# Patient Record
Sex: FEMALE | Race: BLACK OR AFRICAN AMERICAN | NOT HISPANIC OR LATINO | Employment: STUDENT | URBAN - METROPOLITAN AREA
[De-identification: names, ages, dates, MRNs, and addresses within clinical notes are randomized per-mention and may not be internally consistent; named-entity substitution may affect disease eponyms.]

---
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
## 2025-02-04 ENCOUNTER — OFFICE VISIT (OUTPATIENT)
Dept: OBGYN CLINIC | Facility: CLINIC | Age: 20
End: 2025-02-04
Payer: COMMERCIAL

## 2025-02-04 ENCOUNTER — PREP FOR PROCEDURE (OUTPATIENT)
Dept: INTERVENTIONAL RADIOLOGY/VASCULAR | Facility: CLINIC | Age: 20
End: 2025-02-04

## 2025-02-04 VITALS
TEMPERATURE: 97.8 F | OXYGEN SATURATION: 97 % | HEIGHT: 68 IN | HEART RATE: 79 BPM | WEIGHT: 149.2 LBS | BODY MASS INDEX: 22.61 KG/M2 | RESPIRATION RATE: 16 BRPM

## 2025-02-04 DIAGNOSIS — R22.32 MASS OF LEFT ELBOW: Primary | ICD-10-CM

## 2025-02-04 DIAGNOSIS — M79.89 SOFT TISSUE MASS: Primary | ICD-10-CM

## 2025-02-04 PROCEDURE — 99204 OFFICE O/P NEW MOD 45 MIN: CPT | Performed by: STUDENT IN AN ORGANIZED HEALTH CARE EDUCATION/TRAINING PROGRAM

## 2025-02-04 RX ORDER — SODIUM CHLORIDE 9 MG/ML
30 INJECTION, SOLUTION INTRAVENOUS CONTINUOUS
OUTPATIENT
Start: 2025-02-04

## 2025-02-04 NOTE — PATIENT INSTRUCTIONS
You have been sent for a biopsy with interventional radiology. Biopsy results can take several weeks to be reviewed by both a pathologist and Dr. Hernandez personally, additionally any other specialist required. At times, biopsies are sent to specialty hospitals for additional testing which extends the turn around time for results. Please call the office at [phone] to ensure your follow-up appointment has been scheduled.

## 2025-02-04 NOTE — PROGRESS NOTES
Orthopedic Oncology Surgery Office Note  Asiya Rouse (19 y.o. female)  : 2005 Encounter Date: 2025  Dr. Cristóbal Hernandez, , Orthopedic Surgeon  Orthopedic Oncology & Sarcoma Surgery   Phone:[phone] Fax:[phone]    Assessment, Plan, & Discussion:   Asiya Rouse is a 19 y.o. female with:    Soft tissue mass of left elbow  Discussed with the patient that:  - Reviewed physical exam and imaging with patient at time of visit. Radiographic findings demonstrate soft tissue mass of left lateral elbow.  - explained that based on imaging, the mass appears to be benign however there is not way to be sure without having a biopsy and a pathology  -Discussed that based on the imaging I feel this is possibly an arteriovenous malformation although could be a malignancy.  - referral to IR for biopsy was placed at time of visit  - patient will follow-up in the office approximately 2 weeks following biopsy to review results and develop a treatment plan  - The patient expresses understanding and is in agreement with today's treatment plan.       Comorbidity, including: none  - continue current management     Surgical Planning:   Future surgical planning based on imaging results    Follow Up & Tasks:     Return for After IR biopsy.     Tasks:  Review pathology results  ___________________________________________________________________________    History of Present Illness:     Asiya Rouse is a 19 y.o. female with history of left elbow mass who presents for consultation at the request of [employer] regarding left elbow mass.  On presentation today patient presents with her mother at time of visit.  Patient states that the mass has been present for approximately 9 years, and that she does not member any mechanism of injury or accident or trauma that caused the mass to begin.  She states that one day she looked down on her arm and noticed there was a mass there but it has not gone away since.  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 She states that she does not believe that it has changed in size over the past 9 years.  She states that she only has pain when she has direct contact with the mass or when trying to extend her elbow fully.  She states this has not limited her ability to complete normal daily activities.      At baseline patient gaits without assistance.  Denies constitutional symptoms such as fever, chills, night sweats, fatigue, weight gains/losses. Denies  chest pain/shortness of breath.  Patient Denies  personal history of cancer.    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  Complete 12 system review performed and found to be negative except: except as per mentioned in HPI.    Oncology and Treatment History:      Review of referring provider's records:  Referring provider: [employer]  Date: Tiffanie [facility] PA-C  Impression: Patient referred to Orthopedic Oncology at [facility]    Patient Care team:   Patient Care Team:  [employer] as PCP - General     Oncology History    No history exists.       Physical Examination:     Height: 5' 8\" (172.7 cm)  Weight - Scale: 67.7 kg (149 lb 3.2 oz)  BMI (Calculated): 22.7  BSA (Calculated - m2): 1.8 sq meters  Pulse Oximetry  SpO2: 97 %     Vitals:    02/04/25 0942   Pulse: 79   Resp: 16   Temp: 97.8 °F (36.6 °C)   SpO2: 97%     Body mass index is 22.69 kg/m².    General: alert and oriented x 3; well nourished/well developed; no apparent distress.   Present with mother  Psychiatric: normal mood and affect  HEENT: NCAT. Head/neck - full range of motion.   Lungs: breathing comfortably; equal symmetric chest expansion.   Abdomen: soft, non-tender, non-distended.   Skin: warm; dry; no lesions, rashes, petechiae or purpura; " no clubbing, no cyanosis, no edema, soft tissue mass of left lateral elbow, semi-hard palpable    Extremity: Left elbow   Inspection: no edema, skin abnormalities throughout   Palpation:  soft tissue mass of left lateral elbow, semi-hard palpable   Range of motion of joints: WNL range of motion of right upper extremity, decreased elbow extension of left elbow approximately 10 degrees   Motor strength: WNL all extremities.  intact. Dorsal/Plantar flexion: intact.   Sensation: grossly intact to all extremities.    Pulses: intact   Lymphatics: (no obvious) lymphadenopathy  Gait: normal gait.      Imaging Results:   All images personally review today by Dr. Hernandez    Study: MRI left elbow w [facility]  Date: 1/29/25  Report: I have read and agree with the radiologist report. and I have personally reviewed the imaging in PACS and my impression is as follows:  Impression:     1. Septated soft tissue mass, along the lateral cortex of the distal humeral   metadiaphysis/capitellum with mild adjacent bone marrow edema and osseous   remodeling; nonspecific; differential considerations include giant cell tumor of   tendon sheath, myxoma, less likely a vascular lesion; malignant etiologies   remain a consideration, however, felt to be less likely; orthopedic surgical   oncology consultation recommended     2.  Mild intramuscular edema and atrophy of the extensor carpi radialis brevis   muscle, likely secondary to the adjacent soft tissue mass     Study: MRI left elbow [facility]  Date: 1/14/25  Report: I have read and agree with the radiologist report. and I have personally reviewed the imaging in PACS and my impression is as follows:  IMPRESSION:     Soft tissue mass measuring up to 3.5 cm, centered just above the lateral   epicondyle and extensor carpi radialis longus musculature, as discussed.   Orthopedic oncology consultation is recommended.     Study: XR left elbow  Date: 12/03/24  Report: I have read and agree with the radiologist  report. and I have personally reviewed the imaging in PACS and my impression is as follows:  Right elbow x-rays 3 views done in clinic today to evaluate right elbow   pain: There are no acute fractures or dislocations.  There are no bone   lesions or masses.  There are no significant degenerative changes.       Study: US upper extremity  Date: 12/02/24  Report: I have read and agree with the radiologist report. 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 Further evaluation with MRI left elbow with and without intravenous contrast is recommended. MRI appears to be ordered in the PACS system and to be done on 12/23/2024.     No discrete mass or fluid collection involving the right elbow in the visualized   portions in the area of patient's palpable abnormality pointed out by the   patient. Continued clinical follow-up is recommended and if symptoms persist   further evaluation with MRI recommended.       Pathology & Pertinent Laboratory Findings:      Pertinent laboratory findings:  N/a    Pathology: Pending  Patient scheduled for IR biopsy    Microbiology:  Cultures: N/a    Medical, Surgical, Family, and Social History      History reviewed. No pertinent past medical history.  Past Surgical History:   Procedure Laterality Date    ARTHROSCOPY KNEE Left 5/7/2021    Procedure: ARTHROSCOPY KNEE;  Surgeon: Kevin Arriola DO;  Location: [facility];  Service: Orthopedics    KNEE ARTHROSCOPY Left     KNEE ARTHROSCOPY W/ MENISCAL REPAIR Left 5/7/2021    Procedure: REPAIR MENISCUS;  Surgeon: Kevin Arriola DO;  Location: [facility];  Service: Orthopedics    MS ARTHROSCOPY KNEE W/LYSIS ADHESIONS W/WO MANJ SPX Left 8/23/2021    Procedure: MS KNEE SCOPE,LYSIS  OF ADHESNS synovectomy manipulation of left knee under anesthesia;  Surgeon: Wolfgang Metzger MD;  Location: [facility];  Service: Orthopedics    DE MANIPULATION KNEE JOINT UNDER GENERAL ANESTHESIA Left 9/9/2021    Procedure: MANIPULATION JOINT KNEE;  Surgeon: Wolfgang Metzger MD;  Location: [facility];  Service: Orthopedics     No current outpatient medications on file.  No Known Allergies  Family History   Problem Relation Age of Onset    No Known Problems Mother     Diabetes Father      Social History     Socioeconomic History    Marital status: Single     Spouse name: Not on file    Number of children: Not on file    Years of education: Not on file    Highest education level: Not on file   Occupational History    Not on file   Tobacco Use    Smoking status: Never    Smokeless tobacco: Never   Vaping Use    Vaping status: Never Used   Substance and Sexual Activity    Alcohol use: Never    Drug use: Never    Sexual activity: Never   Other Topics Concern    Not on file   Social History Narrative    Not on file     Social Drivers of Health     Financial Resource Strain: Low Risk  (11/22/2024)    Received from [facility]    Overall Financial Resource Strain (CARDIA)     Difficulty of Paying Living Expenses: Not hard at all   Food Insecurity: No Food Insecurity (11/22/2024)    Received from [facility]    Hunger Vital Sign     Worried About Running Out of Food in the Last Year: Never true     Ran Out of Food in the Last Year: Never true   Transportation Needs: No Transportation Needs (11/22/2024)    Received from [facility]    PRAPARE - Transportation     Lack of Transportation (Medical): No     Lack of Transportation (Non-Medical): No   Physical Activity: Not on file   Stress: Not on file   Social Connections: Feeling Socially Isolated (11/22/2024)    Received from [facility]    OASIS : Social Isolation     How often do you feel lonely or  isolated from those around you?: Often   Intimate Partner Violence: Not At Risk (11/22/2024)    Received from [facility]    Humiliation, Afraid, Rape, and Kick questionnaire     Fear of Current or Ex-Partner: No     Emotionally Abused: No     Physically Abused: No     Sexually Abused: No   Housing Stability: Unknown (11/22/2024)    Received from [facility]    Housing Stability Vital Sign     Unable to Pay for Housing in the Last Year: No     Number of Times Moved in the Last Year: Not on file     Homeless in the Last Year: No       This Visit:       Scribe Attestation      I,:  Racheal Mcguire am acting as a scribe while in the presence of the attending physician.:       I,:  Cristóbal Hernandez, DO personally performed the services described in this documentation    as scribed in my presence.:                Associated Orders:     Problem List Items Addressed This Visit    None  Visit Diagnoses         Soft tissue mass    -  Primary    Relevant Orders    Ambulatory Referral to Interventional Radiology

## 2025-02-19 NOTE — PRE-PROCEDURE INSTRUCTIONS
Voicemail left with an arrival time of 9am on 2/26 at [facility] with IR for her biopsy. Pt was instructed to have nothing to eat or drink after midnight and to have a ride home as she is booked with sedation and can not drive herself home. Ivan back number provided for any questions.

## 2025-02-26 ENCOUNTER — HOSPITAL ENCOUNTER (OUTPATIENT)
Dept: CT IMAGING | Facility: HOSPITAL | Age: 20
Discharge: HOME | End: 2025-02-26
Attending: RADIOLOGY
Payer: COMMERCIAL

## 2025-02-26 VITALS
RESPIRATION RATE: 40 BRPM | HEART RATE: 67 BPM | SYSTOLIC BLOOD PRESSURE: 118 MMHG | OXYGEN SATURATION: 100 % | TEMPERATURE: 97.4 F | DIASTOLIC BLOOD PRESSURE: 66 MMHG

## 2025-02-26 DIAGNOSIS — R22.32 MASS OF LEFT ELBOW: ICD-10-CM

## 2025-02-26 LAB
BASOPHILS # BLD AUTO: 0.02 THOUSANDS/ÂΜL (ref 0–0.1)
BASOPHILS NFR BLD AUTO: 1 % (ref 0–1)
EOSINOPHIL # BLD AUTO: 0.03 THOUSAND/ÂΜL (ref 0–0.61)
EOSINOPHIL NFR BLD AUTO: 1 % (ref 0–6)
ERYTHROCYTE [DISTWIDTH] IN BLOOD BY AUTOMATED COUNT: 17 % (ref 11.6–15.1)
EXT PREGNANCY TEST URINE: NEGATIVE
EXT. CONTROL: NORMAL
HCT VFR BLD AUTO: 31.8 % (ref 34.8–46.1)
HGB BLD-MCNC: 8.5 G/DL (ref 11.5–15.4)
IMM GRANULOCYTES # BLD AUTO: 0.01 THOUSAND/UL (ref 0–0.2)
IMM GRANULOCYTES NFR BLD AUTO: 0 % (ref 0–2)
INR PPP: 0.93 (ref 0.85–1.19)
LYMPHOCYTES # BLD AUTO: 1.03 THOUSANDS/ÂΜL (ref 0.6–4.47)
LYMPHOCYTES NFR BLD AUTO: 32 % (ref 14–44)
MCH RBC QN AUTO: 19.3 PG (ref 26.8–34.3)
MCHC RBC AUTO-ENTMCNC: 26.7 G/DL (ref 31.4–37.4)
MCV RBC AUTO: 72 FL (ref 82–98)
MONOCYTES # BLD AUTO: 0.29 THOUSAND/ÂΜL (ref 0.17–1.22)
MONOCYTES NFR BLD AUTO: 9 % (ref 4–12)
NEUTROPHILS # BLD AUTO: 1.86 THOUSANDS/ÂΜL (ref 1.85–7.62)
NEUTS SEG NFR BLD AUTO: 57 % (ref 43–75)
NRBC BLD AUTO-RTO: 0 /100 WBCS
PLATELET # BLD AUTO: 259 THOUSANDS/UL (ref 149–390)
PMV BLD AUTO: 9.7 FL (ref 8.9–12.7)
PROTHROMBIN TIME: 13.2 SECONDS (ref 12.3–15)
RBC # BLD AUTO: 4.41 MILLION/UL (ref 3.81–5.12)
WBC # BLD AUTO: 3.24 THOUSAND/UL (ref 4.31–10.16)

## 2025-02-26 PROCEDURE — 76942 ECHO GUIDE FOR BIOPSY: CPT | Performed by: STUDENT IN AN ORGANIZED HEALTH CARE EDUCATION/TRAINING PROGRAM

## 2025-02-26 PROCEDURE — 85025 COMPLETE CBC W/AUTO DIFF WBC: CPT | Performed by: RADIOLOGY

## 2025-02-26 PROCEDURE — 99152 MOD SED SAME PHYS/QHP 5/>YRS: CPT | Performed by: STUDENT IN AN ORGANIZED HEALTH CARE EDUCATION/TRAINING PROGRAM

## 2025-02-26 PROCEDURE — 88305 TISSUE EXAM BY PATHOLOGIST: CPT | Performed by: PATHOLOGY

## 2025-02-26 PROCEDURE — 85610 PROTHROMBIN TIME: CPT | Performed by: RADIOLOGY

## 2025-02-26 PROCEDURE — 88184 FLOWCYTOMETRY/ TC 1 MARKER: CPT | Performed by: STUDENT IN AN ORGANIZED HEALTH CARE EDUCATION/TRAINING PROGRAM

## 2025-02-26 PROCEDURE — 88333 PATH CONSLTJ SURG CYTO XM 1: CPT | Performed by: PATHOLOGY

## 2025-02-26 PROCEDURE — 20206 BIOPSY MUSCLE PERQ NEEDLE: CPT | Performed by: STUDENT IN AN ORGANIZED HEALTH CARE EDUCATION/TRAINING PROGRAM

## 2025-02-26 PROCEDURE — 88185 FLOWCYTOMETRY/TC ADD-ON: CPT | Performed by: STUDENT IN AN ORGANIZED HEALTH CARE EDUCATION/TRAINING PROGRAM

## 2025-02-26 RX ORDER — MIDAZOLAM HYDROCHLORIDE 2 MG/2ML
INJECTION, SOLUTION INTRAMUSCULAR; INTRAVENOUS AS NEEDED
Status: COMPLETED | OUTPATIENT
Start: 2025-02-26 | End: 2025-02-26

## 2025-02-26 RX ORDER — SODIUM CHLORIDE 9 MG/ML
30 INJECTION, SOLUTION INTRAVENOUS CONTINUOUS
Status: DISCONTINUED | OUTPATIENT
Start: 2025-02-26 | End: 2025-03-02 | Stop reason: HOSPADM

## 2025-02-26 RX ORDER — FENTANYL CITRATE 50 UG/ML
INJECTION, SOLUTION INTRAMUSCULAR; INTRAVENOUS AS NEEDED
Status: COMPLETED | OUTPATIENT
Start: 2025-02-26 | End: 2025-02-26

## 2025-02-26 RX ORDER — LIDOCAINE WITH 8.4% SOD BICARB 0.9%(10ML)
SYRINGE (ML) INJECTION AS NEEDED
Status: COMPLETED | OUTPATIENT
Start: 2025-02-26 | End: 2025-02-26

## 2025-02-26 RX ADMIN — MIDAZOLAM HYDROCHLORIDE 1 MG: 1 INJECTION, SOLUTION INTRAMUSCULAR; INTRAVENOUS at 12:01

## 2025-02-26 RX ADMIN — FENTANYL CITRATE 25 MCG: 50 INJECTION, SOLUTION INTRAMUSCULAR; INTRAVENOUS at 12:22

## 2025-02-26 RX ADMIN — Medication 5 ML: at 12:13

## 2025-02-26 RX ADMIN — FENTANYL CITRATE 25 MCG: 50 INJECTION, SOLUTION INTRAMUSCULAR; INTRAVENOUS at 12:01

## 2025-02-26 RX ADMIN — MIDAZOLAM HYDROCHLORIDE 0.5 MG: 1 INJECTION, SOLUTION INTRAMUSCULAR; INTRAVENOUS at 12:22

## 2025-02-26 NOTE — H&P
Interventional Radiology  History and Physical 2/26/2025     Asiya Rouse   2005   [id number]    Assessment/Plan:  Very pleasant 19-year-old woman with a left elbow soft tissue mass, present for many years, of uncertain etiology.  She has been referred for percutaneous biopsy to establish a diagnosis and guide management.    Problem List Items Addressed This Visit    None  Visit Diagnoses         Mass of left elbow        Relevant Orders    IR biopsy upper limb               Subjective:     Patient ID: Asiya Rouse is a 19 y.o. female.    History of Present Illness  Very pleasant 19-year-old woman with a left elbow soft tissue mass, present for many years, of uncertain etiology.  She has been referred for percutaneous biopsy to establish a diagnosis and guide management.          No past medical history on file.     Past Surgical History:   Procedure Laterality Date    ARTHROSCOPY KNEE Left 5/7/2021    Procedure: ARTHROSCOPY KNEE;  Surgeon: Kevin Arriola DO;  [facility];  Service: Orthopedics    KNEE ARTHROSCOPY Left     KNEE ARTHROSCOPY W/ MENISCAL REPAIR Left 5/7/2021    Procedure: REPAIR MENISCUS;  Surgeon: Kevin Arriola DO;  [facility];  Service: Orthopedics    AL ARTHROSCOPY KNEE W/LYSIS ADHESIONS W/WO MANJ SPX Left 8/23/2021    Procedure: AL KNEE SCOPE,LYSIS OF ADHESNS synovectomy manipulation of left knee under anesthesia;  Surgeon: Wolfgang Metzger MD;  [facility];  Service: Orthopedics    AL MANIPULATION KNEE JOINT UNDER GENERAL ANESTHESIA Left 9/9/2021    Procedure: MANIPULATION JOINT KNEE;  Surgeon: Wolfgang Metzger MD;  [facility]: [facility];  Service: Orthopedics        Social History     Tobacco Use   Smoking Status Never   Smokeless Tobacco Never        Social History     Substance and Sexual Activity   Alcohol Use Never        Social History     Substance and Sexual Activity   Drug Use Never        No Known Allergies    No current outpatient medications on  "file.     Current Facility-Administered Medications   Medication Dose Route Frequency Provider Last Rate Last Admin    sodium chloride 0.9 % infusion  30 mL/hr Intravenous Continuous Marilyn Enriquez MD              Objective:    Vitals:    02/26/25 0913   BP: 130/74   Pulse: 72   Resp: 14   Temp: (!) 97.4 °F (36.3 °C)   TempSrc: Oral   SpO2: 100%     Physical Exam:  General: Well appearing, no acute distress.  HEENT: NC/AT.  EOMI.  CV: RRR  Chest: Normal respiratory effort.  Speaking in full sentences.  Abdomen: Soft, ND/NT.  Skin: Warm and dry  Neuro: Alert and oriented x 3.  No focal deficits.    Left elbow - palpable somewhat firm mass, mildly tender to firm palpation.         No results found for: \"BNP\"   Lab Results   Component Value Date    WBC 3.24 (L) 02/26/2025    HGB 8.5 (L) 02/26/2025    HCT 31.8 (L) 02/26/2025    MCV 72 (L) 02/26/2025     02/26/2025     Lab Results   Component Value Date    INR 0.93 02/26/2025    INR 0.97 08/13/2021    PROTIME 13.2 02/26/2025    PROTIME 12.9 08/13/2021     Lab Results   Component Value Date    PTT 34 08/13/2021         I have personally reviewed pertinent imaging and laboratory results.     Code Status: No Order  Advance Directive and Living Will:      Power of :    POLST:      This text is generated with voice recognition software. There may be translation, syntax,  or grammatical errors. If you have any questions, please contact the dictating provider.   "

## 2025-02-26 NOTE — BRIEF OP NOTE (RAD/CATH)
INTERVENTIONAL RADIOLOGY PROCEDURE NOTE    Date: 2/26/2025    Procedure:   Procedure Summary       Date: 02/26/25 Room / Location: [facility] CT Scan    Anesthesia Start:  Anesthesia Stop:     Procedure: IR BIOPSY UPPER LIMB Diagnosis:       Mass of left elbow      (soft tissue mass of left elbow, rule out malignancy, non diagnostic XR and MRI)    Scheduled Providers:  Responsible Provider:     Anesthesia Type: Not recorded ASA Status: Not recorded            Preoperative diagnosis:   1. Mass of left elbow         Postoperative diagnosis: Same.    Surgeon: Brian Gonzalez MD     Assistant: None. No qualified resident was available.    Blood loss: Minimal    Specimens: Four 18-gauge x 1.3 cm cores     Findings:     Heterogeneous tissue mass in the left elbow, mildly vascular.    Uncomplicated US- and CT-guided biopsy of the mass.  Multiplanar reformats of the CT images were sent to document needle trajectory.    Hemostasis was obtained with manual pressure.    Complications: None immediate.    Anesthesia: conscious sedation

## 2025-03-04 LAB — SCAN RESULT: NORMAL

## 2025-04-01 ENCOUNTER — OFFICE VISIT (OUTPATIENT)
Dept: OBGYN CLINIC | Facility: CLINIC | Age: 20
End: 2025-04-01
Payer: COMMERCIAL

## 2025-04-01 VITALS
HEART RATE: 73 BPM | HEIGHT: 68 IN | TEMPERATURE: 98.1 F | OXYGEN SATURATION: 99 % | WEIGHT: 148.2 LBS | BODY MASS INDEX: 22.46 KG/M2

## 2025-04-01 DIAGNOSIS — Q27.30 ARTERIOVENOUS MALFORMATION (AVM): Primary | ICD-10-CM

## 2025-04-01 PROBLEM — M25.422 ELBOW SWELLING, LEFT: Status: ACTIVE | Noted: 2024-11-22

## 2025-04-01 PROCEDURE — 99215 OFFICE O/P EST HI 40 MIN: CPT | Performed by: STUDENT IN AN ORGANIZED HEALTH CARE EDUCATION/TRAINING PROGRAM

## 2025-04-01 RX ORDER — SODIUM CHLORIDE, SODIUM LACTATE, POTASSIUM CHLORIDE, CALCIUM CHLORIDE 600; 310; 30; 20 MG/100ML; MG/100ML; MG/100ML; MG/100ML
125 INJECTION, SOLUTION INTRAVENOUS CONTINUOUS
OUTPATIENT
Start: 2025-04-01

## 2025-04-01 RX ORDER — CHLORHEXIDINE GLUCONATE ORAL RINSE 1.2 MG/ML
15 SOLUTION DENTAL ONCE
OUTPATIENT
Start: 2025-04-01 | End: 2025-04-01

## 2025-04-01 RX ORDER — CHLORHEXIDINE GLUCONATE 40 MG/ML
SOLUTION TOPICAL DAILY PRN
OUTPATIENT
Start: 2025-04-01

## 2025-04-01 RX ORDER — ACETAMINOPHEN 325 MG/1
975 TABLET ORAL ONCE
OUTPATIENT
Start: 2025-04-01 | End: 2025-04-01

## 2025-04-01 NOTE — PROGRESS NOTES
Orthopedic Oncology Surgery Office Note  Asiya Rouse (19 y.o. female)  : 2005 Encounter Date: 2025  Dr. rCistóbal Hernandez DO, Orthopedic Surgeon  Orthopedic Oncology & Sarcoma Surgery   Phone:[phone] Fax:[phone]    Assessment, Plan, & Discussion:   Asiya Rouse is a 19 y.o. female with:    Name: Asiya Rouse      : 2005      MRN: [id number]  Encounter Provider: Cristóbal Hernandez DO  Encounter Date: 2025   Encounter department: [facility]  :  Assessment & Plan  Arteriovenous malformation (AVM)  Left elbow AVM  Reviewed pathology with patient and family  Patient elects to proceed with excision of the mass  Reviewed risks and benefits of the procedure, below  Orders:    Case request operating room: EXCISION BIOPSY TISSUE LESION/MASS LOWER EXTREMITY; Standing    Comprehensive metabolic panel; Future    CBC and differential; Future    APTT; Future    Protime-INR; Future      Surgical Planning:   Surgery Signup: The patient is indicated for surgical intervention with excision of left elbow AVM. Risks and benefits of the treatment options and surgery were discussed in detail with the patient by Dr. Hernandez. The risks of surgery including infection, bleeding, injury to nerves, injury to the vessels, excess scar tissue formation, risk of failure of the procedure, the possible need for further surgery, and potential risk of loss of limb and life. Specific risks to this procedure discussed, including recurrence, persistent pain, change in diagnosis, need for future surgery .  After weighing all the treatment options available, the patient has opted for surgical intervention and informed consent was obtained. We will schedule the patient to be seen back postoperatively.    Pre-op recommendations:   Hold anticoagulation therapy 5-7 days prior to surgery date  Hold vitamins/minerals/supplements/ NSAIDs 7 days prior to surgery to decrease bleeding  risk.  Hold diabetic medications morning of surgery.  Hold Ace/Arbs/CCB day of surgery  OK to take rest of your medications morning of surgery with small sip of water including pain medication.  NPO night before surgery at midnight  Advised to discuss this with their prescribers as well.    Comorbidity, including: N/A  - continue current management     Follow Up & Tasks:     No follow-ups on file.     Tasks:  Review pathology results  ___________________________________________________________________________    History of Present Illness:     She reports the biopsy going well, and no problems or concerns following the biopsy. Looking forward to having it removed.     Prior HPI:  Asiya Rouse is a 19 y.o. female with history of left elbow mass who presents for consultation at the request of [employer] regarding left elbow mass.  On presentation today patient presents with her mother at time of visit.  Patient states that the mass has been present for approximately 9 years, and that she does not member any mechanism of injury or accident or trauma that caused the mass to begin.  She states that one day she looked down on her arm and noticed there was a mass there but it has not gone away since.  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 She states that she does not believe that it has changed in size over the past 9 years.  She states that she only has pain when she has direct contact with the mass or when trying to extend her elbow fully.  She states this has not limited her ability to complete normal daily activities.      At baseline patient gaits without assistance.  Denies constitutional symptoms such as fever, chills, night sweats, fatigue, weight gains/losses. Denies  chest pain/shortness of breath.  Patient Denies  personal history of cancer.    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  Complete 12 system review performed and found to be negative except: except as per mentioned in HPI.    Oncology and Treatment History:      Review of referring provider's records:  Referring provider: [employer]  Date: Tiffanie Austin PA-C  Impression: Patient referred to Orthopedic Oncology at [place]    Patient Care team:   Patient Care Team:  [employer] as PCP - General     Oncology History    No history exists.       Physical Examination:     Height: 5' 8\" (172.7 cm)  Weight - Scale: 67.2 kg (148 lb 3.2 oz)  BMI (Calculated): 22.5  BSA (Calculated - m2): 1.8 sq meters  Pulse Oximetry  SpO2: 99 %     Vitals:    04/01/25 1037   Pulse: 73   Temp: 98.1 °F (36.7 °C)   SpO2: 99%     Body mass index is 22.53 kg/m².    General: alert and oriented; well nourished/well developed; no apparent distress.   Present with mother  Psychiatric: normal mood and affect  HEENT: NCAT. Head/neck - full range of motion.   Lungs: breathing comfortably; equal symmetric chest expansion.   Abdomen: soft, non-tender, non-distended.   Skin: warm; dry; no lesions, rashes, petechiae or purpura; no clubbing, no cyanosis, no edema, soft tissue mass of left lateral elbow, semi-hard palpable    Extremity: Left elbow   Inspection: no edema, skin abnormalities throughout   Palpation:  soft tissue mass of left lateral elbow, semi-hard palpable. Biopsy site noted    Range of motion of joints: WNL range of motion of right upper extremity, decreased elbow extension of left elbow approximately 10 degrees   Motor strength: WNL all extremities.  intact. Dorsal/Plantar flexion: intact.   Sensation: grossly intact to all extremities.    Pulses: intact   Lymphatics: (no obvious) lymphadenopathy  Gait: normal gait.      Imaging Results:   All images personally review today by Dr. Hernandez    Study: MRI left elbow w [facility]  Date: " 1/29/25  Report: I have read and agree with the radiologist report. and I have personally reviewed the imaging in PACS and my impression is as follows:  Impression:     1. Septated soft tissue mass, along the lateral cortex of the distal humeral   metadiaphysis/capitellum with mild adjacent bone marrow edema and osseous   remodeling; nonspecific; differential considerations include giant cell tumor of   tendon sheath, myxoma, less likely a vascular lesion; malignant etiologies   remain a consideration, however, felt to be less likely; orthopedic surgical   oncology consultation recommended     2.  Mild intramuscular edema and atrophy of the extensor carpi radialis brevis   muscle, likely secondary to the adjacent soft tissue mass     Study: MRI left elbow [facility]  Date: 1/14/25  Report: I have read and agree with the radiologist report. and I have personally reviewed the imaging in PACS and my impression is as follows:  IMPRESSION:     Soft tissue mass measuring up to 3.5 cm, centered just above the lateral   epicondyle and extensor carpi radialis longus musculature, as discussed.   Orthopedic oncology consultation is recommended.     Study: XR left elbow  Date: 12/03/24  Report: I have read and agree with the radiologist report. and I have personally reviewed the imaging in PACS and my impression is as follows:  Right elbow x-rays 3 views done in clinic today to evaluate right elbow   pain: There are no acute fractures or dislocations.  There are no bone   lesions or masses.  There are no significant degenerative changes.       Study: US upper extremity  Date: 12/02/24  Report: I have read and agree with the radiologist report. 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 Further evaluation with MRI left elbow with and without intravenous contrast is recommended. MRI appears to be ordered in the PACS system and to be done on 12/23/2024.     No discrete mass or fluid collection involving the right elbow in the visualized   portions in the area of patient's palpable abnormality pointed out by the   patient. Continued clinical follow-up is recommended and if symptoms persist   further evaluation with MRI recommended.       Pathology & Pertinent Laboratory Findings:      Pertinent laboratory findings:  N/a    Pathology: FINAL  A.  Left elbow mass (core biopsy):     - Features suggesting possible arteriovenous malformation.    Microbiology:  Cultures: N/a    Medical, Surgical, Family, and Social History      History reviewed. No pertinent past medical history.  Past Surgical History:   Procedure Laterality Date    ARTHROSCOPY KNEE Left 5/7/2021    Procedure: ARTHROSCOPY KNEE;  Surgeon: Kevin Arriola DO;  Location: [facility];  Service: Orthopedics    IR BIOPSY UPPER LIMB  2/26/2025    KNEE ARTHROSCOPY Left     KNEE ARTHROSCOPY W/ MENISCAL REPAIR Left 5/7/2021    Procedure: REPAIR MENISCUS;  Surgeon: Kevin Arriola DO;  Location: [facility];  Service: Orthopedics    [place] ARTHROSCOPY KNEE W/LYSIS ADHESIONS W/WO MANJ SPX Left 8/23/2021    Procedure: NY KNEE SCOPE,LYSIS OF ADHESNS synovectomy manipulation of left knee under anesthesia;  Surgeon: Wolfgang Metzger MD;  Location: [facility];  Service: Orthopedics    [place] MANIPULATION KNEE JOINT UNDER GENERAL ANESTHESIA Left 9/9/2021    Procedure: MANIPULATION JOINT KNEE;  Surgeon: Wolfgang Metzger MD;  Location: [facility];  Service: Orthopedics     No current outpatient medications on file.  No Known Allergies  Family History   Problem Relation Age of Onset    No Known Problems Mother     Diabetes Father      Social History     Socioeconomic History    Marital status: Single     Spouse name: Not on file    Number of children: Not  on file    Years of education: Not on file    Highest education level: Not on file   Occupational History    Not on file   Tobacco Use    Smoking status: Never    Smokeless tobacco: Never   Vaping Use    Vaping status: Never Used   Substance and Sexual Activity    Alcohol use: Never    Drug use: Never    Sexual activity: Never   Other Topics Concern    Not on file   Social History Narrative    Not on file     Social Drivers of Health     Financial Resource Strain: Low Risk  (11/22/2024)    Received from [facility]    Overall Financial Resource Strain (CARDIA)     Difficulty of Paying Living Expenses: Not hard at all   Food Insecurity: No Food Insecurity (11/22/2024)    Received from [facility]    Hunger Vital Sign     Worried About Running Out of Food in the Last Year: Never true     Ran Out of Food in the Last Year: Never true   Transportation Needs: No Transportation Needs (11/22/2024)    Received from [facility]    PRAPARE - Transportation     Lack of Transportation (Medical): No     Lack of Transportation (Non-Medical): No   Physical Activity: Not on file   Stress: Not on file   Social Connections: Feeling Socially Isolated (11/22/2024)    Received from [facility]    OASIS : Social Isolation     How often do you feel lonely or isolated from those around you?: Often   Intimate Partner Violence: Not At Risk (11/22/2024)    Received from [facility]    Humiliation, Afraid, Rape, and Kick questionnaire     Fear of Current or Ex-Partner: No     Emotionally Abused: No     Physically Abused: No     Sexually Abused: No   Housing Stability: Unknown (11/22/2024)    Received from [facility]    Housing Stability Vital Sign     Unable to Pay for Housing in the Last Year: No     Number of Times Moved in the Last Year: Not on file     Homeless in the Last Year: No       This Visit:     Julien ZAMARRIPA PA-C, scribed  this note in conjunction with and in the presence of Dr. Cristóbal Hernandez DO, who performed parts of the services as described in this documentation        Associated Orders:     Problem List Items Addressed This Visit    None  Visit Diagnoses         Arteriovenous malformation (AVM)    -  Primary    Relevant Orders    Case request operating room: EXCISION BIOPSY TISSUE LESION/MASS LOWER EXTREMITY (Completed)    Comprehensive metabolic panel    CBC and differential    APTT    Protime-INR

## 2025-04-01 NOTE — H&P (VIEW-ONLY)
Orthopedic Oncology Surgery Office Note  Asiya Rouse (19 y.o. female)  : 2005 Encounter Date: 2025  Dr. Cristóbal Hernandez DO, Orthopedic Surgeon  Orthopedic Oncology & Sarcoma Surgery   Phone:[phone] Fax:[phone]    Assessment, Plan, & Discussion:   Asiya Rouse is a 19 y.o. female with:    Name: Asiya Rouse      : 2005      MRN: [id number]  Encounter Provider: Cristóbal Hernandez DO  Encounter Date: 2025   Encounter department: [facility] [place]  :  Assessment & Plan  Arteriovenous malformation (AVM)  Left elbow AVM  Reviewed pathology with patient and family  Patient elects to proceed with excision of the mass  Reviewed risks and benefits of the procedure, below  Orders:    Case request operating room: EXCISION BIOPSY TISSUE LESION/MASS LOWER EXTREMITY; Standing    Comprehensive metabolic panel; Future    CBC and differential; Future    APTT; Future    Protime-INR; Future      Surgical Planning:   Surgery Signup: The patient is indicated for surgical intervention with excision of left elbow AVM. Risks and benefits of the treatment options and surgery were discussed in detail with the patient by Dr. Hernandez. The risks of surgery including infection, bleeding, injury to nerves, injury to the vessels, excess scar tissue formation, risk of failure of the procedure, the possible need for further surgery, and potential risk of loss of limb and life. Specific risks to this procedure discussed, including recurrence, persistent pain, change in diagnosis, need for future surgery .  After weighing all the treatment options available, the patient has opted for surgical intervention and informed consent was obtained. We will schedule the patient to be seen back postoperatively.    Pre-op recommendations:   Hold anticoagulation therapy 5-7 days prior to surgery date  Hold vitamins/minerals/supplements/ NSAIDs 7 days prior to surgery to decrease bleeding  risk.  Hold diabetic medications morning of surgery.  Hold Ace/Arbs/CCB day of surgery  OK to take rest of your medications morning of surgery with small sip of water including pain medication.  NPO night before surgery at midnight  Advised to discuss this with their prescribers as well.    Comorbidity, including: N/A  - continue current management     Follow Up & Tasks:     No follow-ups on file.     Tasks:  Review pathology results  ___________________________________________________________________________    History of Present Illness:     She reports the biopsy going well, and no problems or concerns following the biopsy. Looking forward to having it removed.     Prior HPI:  Asiya Rouse is a 19 y.o. female with history of left elbow mass who presents for consultation at the request of [employer] regarding left elbow mass.  On presentation today patient presents with her mother at time of visit.  Patient states that the mass has been present for approximately 9 years, and that she does not member any mechanism of injury or accident or trauma that caused the mass to begin.  She states that one day she looked down on her arm and noticed there was a mass there but it has not gone away since.  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 She states that she does not believe that it has changed in size over the past 9 years.  She states that she only has pain when she has direct contact with the mass or when trying to extend her elbow fully.  She states this has not limited her ability to complete normal daily activities.      At baseline patient gaits without assistance.  Denies constitutional symptoms such as fever, chills, night sweats, fatigue, weight gains/losses. Denies  chest pain/shortness of breath.  Patient Denies  personal history of cancer.    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  Complete 12 system review performed and found to be negative except: except as per mentioned in HPI.    Oncology and Treatment History:      Review of referring provider's records:  Referring provider: [employer]  Date: Tiffanie Austin PA-C  Impression: Patient referred to Orthopedic Oncology at [place]    Patient Care team:   Patient Care Team:  [employer] as PCP - General     Oncology History    No history exists.       Physical Examination:     Height: 5' 8\" (172.7 cm)  Weight - Scale: 67.2 kg (148 lb 3.2 oz)  BMI (Calculated): 22.5  BSA (Calculated - m2): 1.8 sq meters  Pulse Oximetry  SpO2: 99 %     Vitals:    04/01/25 1037   Pulse: 73   Temp: 98.1 °F (36.7 °C)   SpO2: 99%     Body mass index is 22.53 kg/m².    General: alert and oriented; well nourished/well developed; no apparent distress.   Present with mother  Psychiatric: normal mood and affect  HEENT: NCAT. Head/neck - full range of motion.   Lungs: breathing comfortably; equal symmetric chest expansion.   Abdomen: soft, non-tender, non-distended.   Skin: warm; dry; no lesions, rashes, petechiae or purpura; no clubbing, no cyanosis, no edema, soft tissue mass of left lateral elbow, semi-hard palpable    Extremity: Left elbow   Inspection: no edema, skin abnormalities throughout   Palpation:  soft tissue mass of left lateral elbow, semi-hard palpable. Biopsy site noted    Range of motion of joints: WNL range of motion of right upper extremity, decreased elbow extension of left elbow approximately 10 degrees   Motor strength: WNL all extremities.  intact. Dorsal/Plantar flexion: intact.   Sensation: grossly intact to all extremities.    Pulses: intact   Lymphatics: (no obvious) lymphadenopathy  Gait: normal gait.      Imaging Results:   All images personally review today by Dr. Hernandez    Study: MRI left elbow w [facility]  Date: " 1/29/25  Report: I have read and agree with the radiologist report. and I have personally reviewed the imaging in PACS and my impression is as follows:  Impression:     1. Septated soft tissue mass, along the lateral cortex of the distal humeral   metadiaphysis/capitellum with mild adjacent bone marrow edema and osseous   remodeling; nonspecific; differential considerations include giant cell tumor of   tendon sheath, myxoma, less likely a vascular lesion; malignant etiologies   remain a consideration, however, felt to be less likely; orthopedic surgical   oncology consultation recommended     2.  Mild intramuscular edema and atrophy of the extensor carpi radialis brevis   muscle, likely secondary to the adjacent soft tissue mass     Study: MRI left elbow [facility]  Date: 1/14/25  Report: I have read and agree with the radiologist report. and I have personally reviewed the imaging in PACS and my impression is as follows:  IMPRESSION:     Soft tissue mass measuring up to 3.5 cm, centered just above the lateral   epicondyle and extensor carpi radialis longus musculature, as discussed.   Orthopedic oncology consultation is recommended.     Study: XR left elbow  Date: 12/03/24  Report: I have read and agree with the radiologist report. and I have personally reviewed the imaging in PACS and my impression is as follows:  Right elbow x-rays 3 views done in clinic today to evaluate right elbow   pain: There are no acute fractures or dislocations.  There are no bone   lesions or masses.  There are no significant degenerative changes.       Study: US upper extremity  Date: 12/02/24  Report: I have read and agree with the radiologist report. 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 Further evaluation with MRI left elbow with and without intravenous contrast is recommended. MRI appears to be ordered in the PACS system and to be done on 12/23/2024.     No discrete mass or fluid collection involving the right elbow in the visualized   portions in the area of patient's palpable abnormality pointed out by the   patient. Continued clinical follow-up is recommended and if symptoms persist   further evaluation with MRI recommended.       Pathology & Pertinent Laboratory Findings:      Pertinent laboratory findings:  N/a    Pathology: FINAL  A.  Left elbow mass (core biopsy):     - Features suggesting possible arteriovenous malformation.    Microbiology:  Cultures: N/a    Medical, Surgical, Family, and Social History      History reviewed. No pertinent past medical history.  Past Surgical History:   Procedure Laterality Date    ARTHROSCOPY KNEE Left 5/7/2021    Procedure: ARTHROSCOPY KNEE;  Surgeon: Kevin Arriola DO;  Location: [facility];  Service: Orthopedics    IR BIOPSY UPPER LIMB  2/26/2025    KNEE ARTHROSCOPY Left     KNEE ARTHROSCOPY W/ MENISCAL REPAIR Left 5/7/2021    Procedure: REPAIR MENISCUS;  Surgeon: Kevin Arriola DO;  Location: [facility];  Service: Orthopedics    OR ARTHROSCOPY KNEE W/LYSIS ADHESIONS W/WO MANJ SPX Left 8/23/2021    Procedure: OR KNEE SCOPE,LYSIS OF ADHESNS synovectomy manipulation of left knee under anesthesia;  Surgeon: Wolfgang Metzger MD;  Location: [facility];  Service: Orthopedics    OR MANIPULATION KNEE JOINT UNDER GENERAL ANESTHESIA Left 9/9/2021    Procedure: MANIPULATION JOINT KNEE;  Surgeon: Wolfgang Metzger MD;  Location: [facility];  Service: Orthopedics     No current outpatient medications on file.  No Known Allergies  Family History   Problem Relation Age of Onset    No Known Problems Mother     Diabetes Father      Social History     Socioeconomic History    Marital status: Single     Spouse name: Not on file    Number of children: Not  on file    Years of education: Not on file    Highest education level: Not on file   Occupational History    Not on file   Tobacco Use    Smoking status: Never    Smokeless tobacco: Never   Vaping Use    Vaping status: Never Used   Substance and Sexual Activity    Alcohol use: Never    Drug use: Never    Sexual activity: Never   Other Topics Concern    Not on file   Social History Narrative    Not on file     Social Drivers of Health     Financial Resource Strain: Low Risk  (11/22/2024)    Received from [facility]    Overall Financial Resource Strain (CARDIA)     Difficulty of Paying Living Expenses: Not hard at all   Food Insecurity: No Food Insecurity (11/22/2024)    Received from [facility]    Hunger Vital Sign     Worried About Running Out of Food in the Last Year: Never true     Ran Out of Food in the Last Year: Never true   Transportation Needs: No Transportation Needs (11/22/2024)    Received from [facility]    PRAPARE - Transportation     Lack of Transportation (Medical): No     Lack of Transportation (Non-Medical): No   Physical Activity: Not on file   Stress: Not on file   Social Connections: Feeling Socially Isolated (11/22/2024)    Received from [facility]    OASIS : Social Isolation     How often do you feel lonely or isolated from those around you?: Often   Intimate Partner Violence: Not At Risk (11/22/2024)    Received from [facility]    Humiliation, Afraid, Rape, and Kick questionnaire     Fear of Current or Ex-Partner: No     Emotionally Abused: No     Physically Abused: No     Sexually Abused: No   Housing Stability: Unknown (11/22/2024)    Received from [facility]    Housing Stability Vital Sign     Unable to Pay for Housing in the Last Year: No     Number of Times Moved in the Last Year: Not on file     Homeless in the Last Year: No       This Visit:     Julien ZAMARRIPA PA-C, scribed  this note in conjunction with and in the presence of Dr. Cristóbal Hernandez DO, who performed parts of the services as described in this documentation        Associated Orders:     Problem List Items Addressed This Visit    None  Visit Diagnoses         Arteriovenous malformation (AVM)    -  Primary    Relevant Orders    Case request operating room: EXCISION BIOPSY TISSUE LESION/MASS LOWER EXTREMITY (Completed)    Comprehensive metabolic panel    CBC and differential    APTT    Protime-INR

## 2025-04-02 ENCOUNTER — PREP FOR PROCEDURE (OUTPATIENT)
Dept: OBGYN CLINIC | Facility: CLINIC | Age: 20
End: 2025-04-02

## 2025-04-02 ENCOUNTER — TELEPHONE (OUTPATIENT)
Dept: OBGYN CLINIC | Facility: CLINIC | Age: 20
End: 2025-04-02

## 2025-04-02 NOTE — TELEPHONE ENCOUNTER
I called patient to go over instructions with her after talking to Dr. Freeman. I let her know some one from our office will be contacting her to schedule her for the end of April 2025, and give her a follow up appointment. She does have her lab scripts. She expressed understanding.

## 2025-04-04 ENCOUNTER — TELEPHONE (OUTPATIENT)
Dept: OBGYN CLINIC | Facility: CLINIC | Age: 20
End: 2025-04-04

## 2025-04-04 NOTE — TELEPHONE ENCOUNTER
Left message on her answering service to Please call me @ [phone], I wanted to give her the date of her surgery, it will be April 30, 2025. Her post -op appointment is on 5/16/2025 with Dr. Hernandez @ 10:15 pm, with 15  minutes early to register. Left my phone number for her to call me direct [phone].

## 2025-04-04 NOTE — TELEPHONE ENCOUNTER
I called and talked to the patient she is scheduled with Dr. Hernandez on 4/30/2025 in [place], she expressed understanding.

## 2025-04-14 ENCOUNTER — APPOINTMENT (OUTPATIENT)
Dept: LAB | Facility: CLINIC | Age: 20
End: 2025-04-14
Payer: COMMERCIAL

## 2025-04-14 DIAGNOSIS — Q27.30 ARTERIOVENOUS MALFORMATION (AVM): ICD-10-CM

## 2025-04-14 PROCEDURE — 80053 COMPREHEN METABOLIC PANEL: CPT

## 2025-04-14 PROCEDURE — 85730 THROMBOPLASTIN TIME PARTIAL: CPT

## 2025-04-14 PROCEDURE — 85610 PROTHROMBIN TIME: CPT

## 2025-04-14 PROCEDURE — 85025 COMPLETE CBC W/AUTO DIFF WBC: CPT

## 2025-04-14 PROCEDURE — 36415 COLL VENOUS BLD VENIPUNCTURE: CPT

## 2025-04-15 LAB
ALBUMIN SERPL BCG-MCNC: 4.6 G/DL (ref 3.5–5)
ALP SERPL-CCNC: 34 U/L (ref 34–104)
ALT SERPL W P-5'-P-CCNC: 13 U/L (ref 7–52)
ANION GAP SERPL CALCULATED.3IONS-SCNC: 8 MMOL/L (ref 4–13)
APTT PPP: 35 SECONDS (ref 23–34)
AST SERPL W P-5'-P-CCNC: 20 U/L (ref 13–39)
BASOPHILS # BLD AUTO: 0.03 THOUSANDS/ÂΜL (ref 0–0.1)
BASOPHILS NFR BLD AUTO: 1 % (ref 0–1)
BILIRUB SERPL-MCNC: 0.89 MG/DL (ref 0.2–1)
BUN SERPL-MCNC: 10 MG/DL (ref 5–25)
CALCIUM SERPL-MCNC: 9 MG/DL (ref 8.4–10.2)
CHLORIDE SERPL-SCNC: 105 MMOL/L (ref 96–108)
CO2 SERPL-SCNC: 24 MMOL/L (ref 21–32)
CREAT SERPL-MCNC: 0.79 MG/DL (ref 0.6–1.3)
EOSINOPHIL # BLD AUTO: 0.04 THOUSAND/ÂΜL (ref 0–0.61)
EOSINOPHIL NFR BLD AUTO: 1 % (ref 0–6)
ERYTHROCYTE [DISTWIDTH] IN BLOOD BY AUTOMATED COUNT: 17.2 % (ref 11.6–15.1)
GFR SERPL CREATININE-BSD FRML MDRD: 107 ML/MIN/1.73SQ M
GLUCOSE P FAST SERPL-MCNC: 75 MG/DL (ref 65–99)
HCT VFR BLD AUTO: 31.8 % (ref 34.8–46.1)
HGB BLD-MCNC: 8.6 G/DL (ref 11.5–15.4)
IMM GRANULOCYTES # BLD AUTO: 0.01 THOUSAND/UL (ref 0–0.2)
IMM GRANULOCYTES NFR BLD AUTO: 0 % (ref 0–2)
INR PPP: 0.92 (ref 0.85–1.19)
LYMPHOCYTES # BLD AUTO: 1.14 THOUSANDS/ÂΜL (ref 0.6–4.47)
LYMPHOCYTES NFR BLD AUTO: 39 % (ref 14–44)
MCH RBC QN AUTO: 19.6 PG (ref 26.8–34.3)
MCHC RBC AUTO-ENTMCNC: 27 G/DL (ref 31.4–37.4)
MCV RBC AUTO: 73 FL (ref 82–98)
MONOCYTES # BLD AUTO: 0.26 THOUSAND/ÂΜL (ref 0.17–1.22)
MONOCYTES NFR BLD AUTO: 9 % (ref 4–12)
NEUTROPHILS # BLD AUTO: 1.48 THOUSANDS/ÂΜL (ref 1.85–7.62)
NEUTS SEG NFR BLD AUTO: 50 % (ref 43–75)
NRBC BLD AUTO-RTO: 0 /100 WBCS
PLATELET # BLD AUTO: 150 THOUSANDS/UL (ref 149–390)
POTASSIUM SERPL-SCNC: 3.8 MMOL/L (ref 3.5–5.3)
PROT SERPL-MCNC: 7.4 G/DL (ref 6.4–8.4)
PROTHROMBIN TIME: 12.6 SECONDS (ref 12.3–15)
RBC # BLD AUTO: 4.38 MILLION/UL (ref 3.81–5.12)
SODIUM SERPL-SCNC: 137 MMOL/L (ref 135–147)
WBC # BLD AUTO: 2.96 THOUSAND/UL (ref 4.31–10.16)

## 2025-04-24 ENCOUNTER — ANESTHESIA EVENT (OUTPATIENT)
Dept: PERIOP | Facility: HOSPITAL | Age: 20
End: 2025-04-24
Payer: COMMERCIAL

## 2025-04-28 NOTE — PRE-PROCEDURE INSTRUCTIONS
Medication instructions for day of surgery reviewed. Please take all instructed medications with only a sip of water.       You will receive a call one business day prior to surgery with an arrival time and hospital directions. If your surgery is scheduled on a Monday, the hospital will be calling you on the Friday prior to your surgery. If you have not heard from anyone by 8pm, please call the hospital supervisor through the hospital  at [phone]. ([place] [phone] or [place] [phone]).    Do not eat or drink anything after midnight the night before your surgery, including candy, mints, lifesavers, or chewing gum. Do not drink alcohol 24hrs before your surgery. Try not to smoke at least 24hrs before your surgery.       Follow the pre surgery showering instructions as listed in the “My Surgical Experience Booklet” or otherwise provided by your surgeon's office. Do not use a blade to shave the surgical area 1 week before surgery. It is okay to use a clean electric clippers up to 24 hours before surgery. Do not apply any lotions, creams, including makeup, cologne, deodorant, or perfumes after showering on the day of your surgery. Do not use dry shampoo, hair spray, hair gel, or any type of hair products.     No contact lenses, eye make-up, or artificial eyelashes. Remove nail polish, including gel polish, and any artificial, gel, or acrylic nails if possible. Remove all jewelry including rings and body piercing jewelry.     Wear causal clothing that is easy to take on and off. Consider your type of surgery.    Keep any valuables, jewelry, piercings at home. Please bring any specially ordered equipment (sling, braces) if indicated.    Arrange for a responsible person to drive you to and from the hospital on the day of your surgery. Please confirm the visitor policy for the day of your procedure when you receive your phone call with an arrival time.     Call the surgeon's office with any new  illnesses, exposures, or additional questions prior to surgery.    Please reference your “My Surgical Experience Booklet” for additional information to prepare for your upcoming surgery.No outpatient medications have been marked as taking for the 4/30/25 encounter (Hospital Encounter).

## 2025-04-30 ENCOUNTER — HOSPITAL ENCOUNTER (OUTPATIENT)
Facility: HOSPITAL | Age: 20
Setting detail: OUTPATIENT SURGERY
Discharge: HOME | End: 2025-04-30
Attending: STUDENT IN AN ORGANIZED HEALTH CARE EDUCATION/TRAINING PROGRAM | Admitting: STUDENT IN AN ORGANIZED HEALTH CARE EDUCATION/TRAINING PROGRAM
Payer: COMMERCIAL

## 2025-04-30 ENCOUNTER — ANESTHESIA (OUTPATIENT)
Dept: PERIOP | Facility: HOSPITAL | Age: 20
End: 2025-04-30
Payer: COMMERCIAL

## 2025-04-30 VITALS
SYSTOLIC BLOOD PRESSURE: 122 MMHG | DIASTOLIC BLOOD PRESSURE: 60 MMHG | WEIGHT: 150.79 LBS | HEART RATE: 78 BPM | RESPIRATION RATE: 19 BRPM | OXYGEN SATURATION: 100 % | BODY MASS INDEX: 22.85 KG/M2 | TEMPERATURE: 98.1 F | HEIGHT: 68 IN

## 2025-04-30 DIAGNOSIS — Q27.30 ARTERIOVENOUS MALFORMATION (AVM): Primary | ICD-10-CM

## 2025-04-30 LAB
EXT PREGNANCY TEST URINE: NEGATIVE
EXT. CONTROL: NORMAL

## 2025-04-30 PROCEDURE — 81025 URINE PREGNANCY TEST: CPT | Performed by: STUDENT IN AN ORGANIZED HEALTH CARE EDUCATION/TRAINING PROGRAM

## 2025-04-30 PROCEDURE — 88342 IMHCHEM/IMCYTCHM 1ST ANTB: CPT | Performed by: PATHOLOGY

## 2025-04-30 PROCEDURE — 88307 TISSUE EXAM BY PATHOLOGIST: CPT | Performed by: PATHOLOGY

## 2025-04-30 PROCEDURE — 24076 EX ARM/ELBOW TUM DEEP < 5 CM: CPT

## 2025-04-30 PROCEDURE — 88341 IMHCHEM/IMCYTCHM EA ADD ANTB: CPT | Performed by: PATHOLOGY

## 2025-04-30 PROCEDURE — 88313 SPECIAL STAINS GROUP 2: CPT | Performed by: PATHOLOGY

## 2025-04-30 PROCEDURE — 24076 EX ARM/ELBOW TUM DEEP < 5 CM: CPT | Performed by: STUDENT IN AN ORGANIZED HEALTH CARE EDUCATION/TRAINING PROGRAM

## 2025-04-30 RX ORDER — CEFAZOLIN SODIUM 2 G/50ML
2000 SOLUTION INTRAVENOUS ONCE
Status: COMPLETED | OUTPATIENT
Start: 2025-04-30 | End: 2025-04-30

## 2025-04-30 RX ORDER — LIDOCAINE HYDROCHLORIDE AND EPINEPHRINE 10; 10 MG/ML; UG/ML
INJECTION, SOLUTION INFILTRATION; PERINEURAL AS NEEDED
Status: DISCONTINUED | OUTPATIENT
Start: 2025-04-30 | End: 2025-04-30 | Stop reason: HOSPADM

## 2025-04-30 RX ORDER — ONDANSETRON 2 MG/ML
4 INJECTION INTRAMUSCULAR; INTRAVENOUS ONCE AS NEEDED
Status: DISCONTINUED | OUTPATIENT
Start: 2025-04-30 | End: 2025-04-30 | Stop reason: HOSPADM

## 2025-04-30 RX ORDER — DEXAMETHASONE SODIUM PHOSPHATE 10 MG/ML
INJECTION, SOLUTION INTRAMUSCULAR; INTRAVENOUS AS NEEDED
Status: DISCONTINUED | OUTPATIENT
Start: 2025-04-30 | End: 2025-04-30

## 2025-04-30 RX ORDER — SODIUM CHLORIDE, SODIUM LACTATE, POTASSIUM CHLORIDE, CALCIUM CHLORIDE 600; 310; 30; 20 MG/100ML; MG/100ML; MG/100ML; MG/100ML
125 INJECTION, SOLUTION INTRAVENOUS CONTINUOUS
Status: DISCONTINUED | OUTPATIENT
Start: 2025-04-30 | End: 2025-04-30 | Stop reason: HOSPADM

## 2025-04-30 RX ORDER — PROMETHAZINE HYDROCHLORIDE 25 MG/ML
6.25 INJECTION, SOLUTION INTRAMUSCULAR; INTRAVENOUS ONCE AS NEEDED
Status: DISCONTINUED | OUTPATIENT
Start: 2025-04-30 | End: 2025-04-30 | Stop reason: HOSPADM

## 2025-04-30 RX ORDER — FENTANYL CITRATE 50 UG/ML
INJECTION, SOLUTION INTRAMUSCULAR; INTRAVENOUS AS NEEDED
Status: DISCONTINUED | OUTPATIENT
Start: 2025-04-30 | End: 2025-04-30

## 2025-04-30 RX ORDER — GLYCOPYRROLATE 0.2 MG/ML
INJECTION INTRAMUSCULAR; INTRAVENOUS AS NEEDED
Status: DISCONTINUED | OUTPATIENT
Start: 2025-04-30 | End: 2025-04-30

## 2025-04-30 RX ORDER — CHLORHEXIDINE GLUCONATE 40 MG/ML
SOLUTION TOPICAL DAILY PRN
Status: DISCONTINUED | OUTPATIENT
Start: 2025-04-30 | End: 2025-04-30 | Stop reason: HOSPADM

## 2025-04-30 RX ORDER — TRAMADOL HYDROCHLORIDE 50 MG/1
50 TABLET ORAL EVERY 6 HOURS PRN
Qty: 40 TABLET | Refills: 0 | Status: SHIPPED | OUTPATIENT
Start: 2025-04-30 | End: 2025-05-10

## 2025-04-30 RX ORDER — ONDANSETRON 2 MG/ML
INJECTION INTRAMUSCULAR; INTRAVENOUS AS NEEDED
Status: DISCONTINUED | OUTPATIENT
Start: 2025-04-30 | End: 2025-04-30

## 2025-04-30 RX ORDER — PROPOFOL 10 MG/ML
INJECTION, EMULSION INTRAVENOUS AS NEEDED
Status: DISCONTINUED | OUTPATIENT
Start: 2025-04-30 | End: 2025-04-30

## 2025-04-30 RX ORDER — CHLORHEXIDINE GLUCONATE ORAL RINSE 1.2 MG/ML
15 SOLUTION DENTAL ONCE
Status: COMPLETED | OUTPATIENT
Start: 2025-04-30 | End: 2025-04-30

## 2025-04-30 RX ORDER — BUPIVACAINE HYDROCHLORIDE AND EPINEPHRINE 2.5; 5 MG/ML; UG/ML
INJECTION, SOLUTION EPIDURAL; INFILTRATION; INTRACAUDAL; PERINEURAL AS NEEDED
Status: DISCONTINUED | OUTPATIENT
Start: 2025-04-30 | End: 2025-04-30 | Stop reason: HOSPADM

## 2025-04-30 RX ORDER — CELECOXIB 100 MG/1
100 CAPSULE ORAL 2 TIMES DAILY
Qty: 28 CAPSULE | Refills: 0 | Status: SHIPPED | OUTPATIENT
Start: 2025-04-30 | End: 2025-05-14

## 2025-04-30 RX ORDER — HYDROMORPHONE HCL/PF 1 MG/ML
0.5 SYRINGE (ML) INJECTION
Status: DISCONTINUED | OUTPATIENT
Start: 2025-04-30 | End: 2025-04-30 | Stop reason: HOSPADM

## 2025-04-30 RX ORDER — FENTANYL CITRATE/PF 50 MCG/ML
50 SYRINGE (ML) INJECTION
Status: DISCONTINUED | OUTPATIENT
Start: 2025-04-30 | End: 2025-04-30 | Stop reason: HOSPADM

## 2025-04-30 RX ORDER — TRAMADOL HYDROCHLORIDE 50 MG/1
50 TABLET ORAL EVERY 6 HOURS SCHEDULED
Status: DISCONTINUED | OUTPATIENT
Start: 2025-04-30 | End: 2025-04-30 | Stop reason: HOSPADM

## 2025-04-30 RX ORDER — SENNA AND DOCUSATE SODIUM 50; 8.6 MG/1; MG/1
1 TABLET, FILM COATED ORAL DAILY
Qty: 14 TABLET | Refills: 0 | Status: SHIPPED | OUTPATIENT
Start: 2025-04-30 | End: 2025-05-14

## 2025-04-30 RX ORDER — LIDOCAINE HYDROCHLORIDE 20 MG/ML
INJECTION, SOLUTION EPIDURAL; INFILTRATION; INTRACAUDAL; PERINEURAL AS NEEDED
Status: DISCONTINUED | OUTPATIENT
Start: 2025-04-30 | End: 2025-04-30

## 2025-04-30 RX ORDER — MEPERIDINE HYDROCHLORIDE 25 MG/ML
12.5 INJECTION INTRAMUSCULAR; INTRAVENOUS; SUBCUTANEOUS ONCE AS NEEDED
Status: DISCONTINUED | OUTPATIENT
Start: 2025-04-30 | End: 2025-04-30 | Stop reason: HOSPADM

## 2025-04-30 RX ORDER — ACETAMINOPHEN 500 MG
1000 TABLET ORAL EVERY 8 HOURS
Qty: 60 TABLET | Refills: 0 | Status: SHIPPED | OUTPATIENT
Start: 2025-04-30 | End: 2025-05-10

## 2025-04-30 RX ORDER — MIDAZOLAM HYDROCHLORIDE 2 MG/2ML
INJECTION, SOLUTION INTRAMUSCULAR; INTRAVENOUS AS NEEDED
Status: DISCONTINUED | OUTPATIENT
Start: 2025-04-30 | End: 2025-04-30

## 2025-04-30 RX ORDER — ACETAMINOPHEN 325 MG/1
975 TABLET ORAL ONCE
Status: COMPLETED | OUTPATIENT
Start: 2025-04-30 | End: 2025-04-30

## 2025-04-30 RX ORDER — ONDANSETRON 4 MG/1
4 TABLET, ORALLY DISINTEGRATING ORAL EVERY 6 HOURS PRN
Qty: 15 TABLET | Refills: 0 | Status: SHIPPED | OUTPATIENT
Start: 2025-04-30

## 2025-04-30 RX ORDER — PHENYLEPHRINE HCL IN 0.9% NACL 1 MG/10 ML
SYRINGE (ML) INTRAVENOUS AS NEEDED
Status: DISCONTINUED | OUTPATIENT
Start: 2025-04-30 | End: 2025-04-30

## 2025-04-30 RX ORDER — ACETAMINOPHEN 325 MG/1
650 TABLET ORAL EVERY 6 HOURS PRN
Status: DISCONTINUED | OUTPATIENT
Start: 2025-04-30 | End: 2025-04-30 | Stop reason: HOSPADM

## 2025-04-30 RX ORDER — ONDANSETRON 2 MG/ML
4 INJECTION INTRAMUSCULAR; INTRAVENOUS EVERY 6 HOURS PRN
Status: DISCONTINUED | OUTPATIENT
Start: 2025-04-30 | End: 2025-04-30 | Stop reason: HOSPADM

## 2025-04-30 RX ADMIN — Medication 50 MCG: at 10:19

## 2025-04-30 RX ADMIN — FENTANYL CITRATE 25 MCG: 50 INJECTION INTRAMUSCULAR; INTRAVENOUS at 09:49

## 2025-04-30 RX ADMIN — SODIUM CHLORIDE, SODIUM LACTATE, POTASSIUM CHLORIDE, AND CALCIUM CHLORIDE: .6; .31; .03; .02 INJECTION, SOLUTION INTRAVENOUS at 10:04

## 2025-04-30 RX ADMIN — CHLORHEXIDINE GLUCONATE 15 ML: 1.2 SOLUTION ORAL at 07:27

## 2025-04-30 RX ADMIN — ACETAMINOPHEN 975 MG: 325 TABLET, FILM COATED ORAL at 07:21

## 2025-04-30 RX ADMIN — DEXAMETHASONE SODIUM PHOSPHATE 10 MG: 10 INJECTION, SOLUTION INTRAMUSCULAR; INTRAVENOUS at 09:26

## 2025-04-30 RX ADMIN — LIDOCAINE HYDROCHLORIDE 100 MG: 20 INJECTION, SOLUTION EPIDURAL; INFILTRATION; INTRACAUDAL at 09:20

## 2025-04-30 RX ADMIN — ONDANSETRON 4 MG: 2 INJECTION INTRAMUSCULAR; INTRAVENOUS at 10:15

## 2025-04-30 RX ADMIN — SODIUM CHLORIDE, SODIUM LACTATE, POTASSIUM CHLORIDE, AND CALCIUM CHLORIDE 125 ML/HR: .6; .31; .03; .02 INJECTION, SOLUTION INTRAVENOUS at 07:48

## 2025-04-30 RX ADMIN — GLYCOPYRROLATE 0.2 MG: 0.2 INJECTION, SOLUTION INTRAMUSCULAR; INTRAVENOUS at 09:20

## 2025-04-30 RX ADMIN — Medication 100 MCG: at 10:13

## 2025-04-30 RX ADMIN — Medication 50 MCG: at 09:56

## 2025-04-30 RX ADMIN — MIDAZOLAM 2 MG: 1 INJECTION INTRAMUSCULAR; INTRAVENOUS at 09:15

## 2025-04-30 RX ADMIN — Medication 50 MCG: at 09:46

## 2025-04-30 RX ADMIN — TRAMADOL HYDROCHLORIDE 50 MG: 50 TABLET, COATED ORAL at 11:33

## 2025-04-30 RX ADMIN — FENTANYL CITRATE 50 MCG: 50 INJECTION INTRAMUSCULAR; INTRAVENOUS at 09:26

## 2025-04-30 RX ADMIN — FENTANYL CITRATE 25 MCG: 50 INJECTION INTRAMUSCULAR; INTRAVENOUS at 09:40

## 2025-04-30 RX ADMIN — Medication 100 MCG: at 10:25

## 2025-04-30 RX ADMIN — CEFAZOLIN SODIUM 2000 MG: 2 SOLUTION INTRAVENOUS at 09:29

## 2025-04-30 RX ADMIN — PROPOFOL 200 MG: 10 INJECTION, EMULSION INTRAVENOUS at 09:20

## 2025-04-30 RX ADMIN — Medication 50 MCG: at 10:04

## 2025-04-30 NOTE — ANESTHESIA POSTPROCEDURE EVALUATION
Post-Op Assessment Note    CV Status:  Stable    Pain management: adequate       Mental Status:  Alert and awake   Hydration Status:  Euvolemic   PONV Controlled:  Controlled   Airway Patency:  Patent     Post Op Vitals Reviewed: Yes    No anethesia notable event occurred.    Staff: Anesthesiologist           Last Filed PACU Vitals:  Vitals Value Taken Time   Temp 97.7 °F (36.5 °C) 04/30/25 1110   Pulse 78 04/30/25 1110   /63 04/30/25 1110   Resp 16 04/30/25 1110   SpO2 100 % 04/30/25 1110       Modified [place]:     Vitals Value Taken Time   Activity 2 04/30/25 1110   Respiration 2 04/30/25 1110   Circulation 2 04/30/25 1110   Consciousness 2 04/30/25 1110   Oxygen Saturation 2 04/30/25 1110     Modified [place] Score: 10

## 2025-04-30 NOTE — ANESTHESIA PREPROCEDURE EVALUATION
Procedure:  Excision of left elbow arteriiovenous malformation (Left: Elbow)    Relevant Problems   No relevant active problems        Physical Exam    Airway    Mallampati score: II         Dental       Cardiovascular  Rhythm: regular, Rate: normal    Pulmonary   Breath sounds clear to auscultation    Other Findings  post-pubertal.      Anesthesia Plan  ASA Score- 2     Anesthesia Type- general with ASA Monitors.         Additional Monitors:     Airway Plan:     Comment: Block if needed.       Plan Factors-Exercise tolerance (METS): >4 METS.    Chart reviewed.   Existing labs reviewed. Patient summary reviewed.    Patient is not a current smoker.      There is medical exclusion for perioperative obstructive sleep apnea risk education.        Induction- intravenous.    Postoperative Plan-     Perioperative Resuscitation Plan - Level 1 - Full Code.       Informed Consent- Anesthetic plan and risks discussed with patient.        NPO Status:  No vitals data found for the desired time range.

## 2025-04-30 NOTE — OP NOTE
OPERATIVE REPORT    PATIENT NAME: Asiya Rouse   :  2005  MRN: [id number]  Pt Location: [facility] OR ROOM 01    SURGERY DATE: 2025    SURGEON(S) and ROLE:  Primary: Cristóbal Hernandez DO  Assisting: Julien Lopez PA-C    NOTE:  The presence of a physician assistant was necessary to help with patient positioning, surgical exposure, wound retraction, wound closure, and other key portions of the procedure.  No qualified resident was available for this case.      PREOPERATIVE DIAGNOSES:  Left elbow arteriovenous malformation type mass    POSTOPERATIVE DIAGNOSES:  Same    PROCEDURES:  Resection of mass left elbow      ANESTHESIA TYPE:  General LMA    ANESTHESIA STAFF:   Anesthesiologist: Jose Manuel Torres DO  CRNA: Sunshine Lerner CRNA    ESTIMATED BLOOD LOSS: Minimal mL    TOURNIQUET TIME: Not used    PERIOPERATIVE ANTIBIOTICS:  cefazolin, 2 grams    IMPLANTS: None    * No implants in log *    SPECIMENS:    ID Type Source Tests Collected by Time Destination   1 : LEFT Elbow mass Tissue Arm, Left TISSUE EXAM Cristóbal Hernandez DO 2025 1008        DRAINS:  None      OPERATIVE INDICATIONS:  The patient is a 19 y.o. female with left elbow biopsy-proven angiomatous mass.  Surgical treatment was indicated due to persistent symptoms despite non-surgical treatment desire for complete removal and surgical resection and pathologic diagnosis.  After a thorough discussion of the potential risks, benefits, and alternative treatments, the patient agreed to proceed with surgery.  The patient understands that the risks of surgery include, but are not limited to: infection, neurovascular injury, wound healing complications, venous thromboembolism, persistent pain, stiffness, instability, recurrence of symptoms, potential need for additional surgeries, and loss of limb or life, recurrence, possible malignancy.  Oral and written consent for surgery was obtained from the patient preoperatively.    PROCEDURE AND TECHNIQUE:  On  the day of surgery, the patient was identified by myself in the preoperative holding area.  The operative site was marked by the surgeon as the proper operative extremity.  She was taken to the operating room where she was transferred operating room table, she placed in the supine position with all bony prominences well-padded.    The patient was anesthetized, sedated in the standard manner and perioperative antibiotics, Ancef, were administered.  The patient was positioned supine on the OR table.  A tourniquet was not used.  The operative site was prepped and draped using standard sterile technique.  A time-out was conducted to confirm the patient's identity, identifying all team members in the room, the operative site, and the proposed procedure.     Approximately 6 cm incision was made over the distal lateral humerus after injecting lidocaine with epinephrine at the surgical site.  Skin was sharply incised down to the deep subcutaneous tissue.  Subcutaneous skin flaps were made both anteriorly and posterio dissection was performed down to the fascia rly.  Overlying the lateral distal humerus.  According to the MRI the mass was just deep to the subcutaneous tissue deep to the fascia right overlying the lateral humerus, posterior to the brachioradialis.  Dissection was performed, fascia was opened and the mass was identified over the lateral and anterior humerus, but it was completely ingrained with the extensor tendon origin.  Meticulous dissection was performed circumferentially around the mass.  A freer elevator and bipolar cautery was used to elevate the mass off of the anterior distal humerus.  The mass was completely ingrained in a part of the muscle fibers and was not able to be  from the tissue.  Anteriorly, meticulous and careful dissection was performed in order to protect the branches of the radial nerve, although I was posterior to the brachial radialis.  Resection was then performed  circumferentially after protecting the deep neurovascular bundles anteriorly and the mass was removed en bloc with a covering of muscularity since it was completely ingrained in the muscle.      Measured on the back table to be approximate 4cm x 4cm.  Blunt palpation was performed there is no leftover masslike tissue, only normal subcutaneous tissue within the area.  Visual inspection was performed which demonstrated normal muscle no evidence of remaining arteriovenous malformation.  Wound was irrigated with normal saline, quarter percent Marcaine injection was then placed within the subdeep subcutaneous tissue.  Deep tissue was closed with #1 Vicryl while protecting anterior neurovascular structures, subcutaneous tissue was closed with 2-0 Monocryl.  Skin was closed with a running 3-0 Monocryl.  Skin was cleansed and dried, Mepilex dressing was placed.       I was present for the entire procedure. A physician assistant was required during the procedure for retraction, tissue handling, dissection and suturing.    COMPLICATIONS:  None    PATIENT DISPOSITION:  PACU  and hemodynamically stable    Plan:   WBAT to operative extremity  Return to activities of daily living  ROM: no restrictions  PT/OT  Anticoagulation: Return to home anticoagulation (none)  Keep mepilex dressing in place until follow up in 10-14 days  Multimodal pain control    SIGNATURE:  Cristóbal Hernandez,   DATE:  April 30, 2025  TIME:  10:26 AM

## 2025-04-30 NOTE — INTERVAL H&P NOTE
H&P reviewed. After examining the patient I find no changes in the patients condition since the H&P had been written.  Patient seen and examined.  Patient has been n.p.o. and is keen for surgery today to remove the mass from her left elbow region.    Vitals:    04/30/25 0740   BP: 127/77   Pulse: 83   Resp: 16   Temp: 97.6 °F (36.4 °C)   SpO2: 99%

## 2025-04-30 NOTE — ANESTHESIA POSTPROCEDURE EVALUATION
Post-Op Assessment Note    CV Status:  Stable  Pain Score: 0    Pain management: adequate    Multimodal analgesia used between 6 hours prior to anesthesia start to PACU discharge    Mental Status:  Alert and awake   Hydration Status:  Stable and euvolemic   PONV Controlled:  None   Airway Patency:  Patent and adequate     Post Op Vitals Reviewed: Yes    No anethesia notable event occurred.    Staff: CRNA           Last Filed PACU Vitals:  Vitals Value Taken Time   Temp 97.6 °F (36.4 °C) 04/30/25 1040   Pulse 88 04/30/25 1043   /59 04/30/25 1041   Resp 16 04/30/25 1040   SpO2 99 % 04/30/25 1043   Vitals shown include unfiled device data.    Modified [place]:     Vitals Value Taken Time   Activity 2 04/30/25 1040   Respiration 2 04/30/25 1040   Circulation 2 04/30/25 1040   Consciousness 2 04/30/25 1040   Oxygen Saturation 2 04/30/25 1040     Modified [place] Score: 10

## 2025-04-30 NOTE — DISCHARGE INSTR - AVS FIRST PAGE
Discharge Instructions  Dr. Cristóbal Hernandez DO, Orthopedic Surgeon  [facility]   Phone:[phone] Fax:[phone]        What to Expect/Activity  It is normal to have some discomfort in the surgical area for several days.  For the next 48 hours use ice packs around your surgical limb,  20-30 minutes every 1-2 hours while you are awake.  Elevate your surgical leg by elevating with pillows as much as possible  You are weight bearing as tolerated  to your operative extremity.    Dressing/Wound Care/Bathing  You may readjust or remove your ACE dressing  Do not remove the sticky bandage until follow up dressing   Do not stick anything down your dressing to itch. Benadryl, ice and light pressure through the bandage can help alleviate itching.  When showering please wrap the ACE wrap in either a cast bag or trash bag secured to prevent the splint from getting wet. Please call the office if the splint becomes wet or dirty.  No baths, swimming or submerging until discussed at your follow-up appointment.     Pain Management/Medications  You may resume your usual home medications unless instructed otherwise.  You have been prescribed several medications. Take as directed on the instructions. If you experience any adverse effects, stop taking them immediately and call the office for additional instructions.   Tylenol: {1000 mg every 8 hours for mild/moderate pain}   This is to prevent blood clots after surgery.   Narcotic pain medication: You may have been prescribed a strong narcotic medication. Take this according to the pharmacy instructions.   Stool Softeners (senna/colace): take daily while taking narcotic pain medication to help prevent constipation   Nerve Block: If you received a nerve block your surgical limb may feel numb with loss of muscle control for up to 18-24 hours after surgery. We recommend taking a dose of pain medication prior to bed tonight, to cover any pain that may  occur if your nerve block begins to wear off while you are sleeping.    Follow up/Call if:  The findings of your surgery will be explained to you and your family immediately after surgery. However, in the post-operative period, during recovery from anesthesia you may not fully remember or fully understand what was said. This will be explained once again when you return for your post-op appointment.   You should call to schedule a post-operative appointment in the office 10-14 days from the date of surgery if not already scheduled.   If you experience fever over 101 degrees, excessive bleeding, persistent nausea or vomiting, decreased sensation or discoloration of the operative leg, or severe uncontrollable pain please contact Dr. Hernandez's office immediately.     Dr. Hernandez's Office Contact: [phone]     yes

## 2025-05-16 ENCOUNTER — OFFICE VISIT (OUTPATIENT)
Dept: OBGYN CLINIC | Facility: CLINIC | Age: 20
End: 2025-05-16

## 2025-05-16 VITALS
TEMPERATURE: 97.9 F | OXYGEN SATURATION: 98 % | HEIGHT: 68 IN | BODY MASS INDEX: 22.28 KG/M2 | RESPIRATION RATE: 16 BRPM | HEART RATE: 71 BPM | WEIGHT: 147 LBS

## 2025-05-16 DIAGNOSIS — Q27.30 ARTERIOVENOUS MALFORMATION (AVM): Primary | ICD-10-CM

## 2025-05-16 PROCEDURE — 99024 POSTOP FOLLOW-UP VISIT: CPT | Performed by: STUDENT IN AN ORGANIZED HEALTH CARE EDUCATION/TRAINING PROGRAM

## 2025-05-16 NOTE — PROGRESS NOTES
"Orthopedic Oncology Post Operative Office Note  Asiya Rouse (19 y.o. female)   : 2005   MRN: [id number]   Encounter Date: 2025  Dr. Cristóbal Hernandez DO, Orthopedic Surgeon  [facility]   DOS: 2025  Procedure performed: Excision of left elbow arteriiovenous malformation - Left      :  Assessment & Plan  Arteriovenous malformation (AVM)  19 y.o. 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     Wound Care   Continue current care., OK to shower., No soaking or bathing for another 2 weeks.  Pain control: PRN  Continue ice packs/elevation  Can continue compression with ACE wrap or compression stockings  Physical therapy: Not indicated at this time however referral to hand therapy placed if she feels that she is lacking ROM or strength  FWB to LUE  Sling/Immobilizer/Brace: None   Complete DVT ppx None  Definitive diagnosis discussed at length with benign intramuscular neoplasm.     Return if symptoms worsen or fail to improve.  Orders:    Ambulatory Referral to PT/OT Hand Therapy; Future             Subjective:  19 y.o. female 2 weeks s/p Excision of left elbow arteriiovenous malformation - Left  DOS: 2025     Pain/Complaints: None reported  Assistive devices: none   Numbness/weakness extremity: None reported    Physical Therapy Progress: Not yet started- prescription provided if she feels that she does not return to full ROM or strength   DVT ppx: Return to home anticoagulation   Abx: N/A   Eating/Drinking improving. Bowel/Bladder: WNL   Denies fever/chills, numbness/tingling, injury/trauma, night sweats    Physical Exam:  Height: 5' 8\" (172.7 cm)  Weight - Scale: 66.7 kg (147 lb)  BMI (Calculated): 22.4  BSA (Calculated - m2): 1.79 sq meters  Pulse Oximetry  SpO2: 98 %     Vitals:    " "05/16/25 1013   Pulse: 71   Resp: 16   Temp: 97.9 °F (36.6 °C)   SpO2: 98%     Body mass index is 22.35 kg/m².    General: alert and oriented x 3; well nourished/well developed; no apparent distress.    Drains:  None placed    Extremity: Left elbow - lateral  mild swelling throughout  Dressing: removed at time of visit  Surgical site:  healing well with no erythema, dehiscence, redness or signs of infection  Sensation: intact   Motor: median/ulnar/radial/AIN/PIN  Brisk cap refill  Calf: bilateral calves soft, nontender    Labs:   N/a    Pathology: FINAL  Final Diagnosis  A. Soft tissue, \"Left elbow mass,\" Resection:  - Benign vascular neoplasm, consistent with intramuscular hemangioma  - Biopsy site changes with chronic inflammation   - Benign skeletal muscle  - One benign lymph node    Microbiology:  Cultures: N/a    Imaging:     Study: MRI left elbow w woc  Date: 1/29/25  Report: I have read and agree with the radiologist report. and I have personally reviewed the imaging in PACS and my impression is as follows:  Impression:     1. Septated soft tissue mass, along the lateral cortex of the distal humeral   metadiaphysis/capitellum with mild adjacent bone marrow edema and osseous remodeling; nonspecific; differential considerations include giant cell tumor of tendon sheath, myxoma, less likely a vascular lesion; malignant etiologies remain a consideration, however, felt to be less likely; orthopedic surgical oncology consultation recommended     2.  Mild intramuscular edema and atrophy of the extensor carpi radialis brevis muscle, likely secondary to the adjacent soft tissue mass      Study: MRI left elbow woc  Date: 1/14/25  Report: I have read and agree with the radiologist report. and I have personally reviewed the imaging in PACS and my impression is as follows:  IMPRESSION:     Soft tissue mass measuring up to 3.5 cm, centered just above the lateral   epicondyle and extensor carpi radialis longus musculature, " as discussed.   Orthopedic oncology consultation is recommended.      Study: XR left elbow  Date: 12/03/24  Report: I have read and agree with the radiologist report. and I have personally reviewed the imaging in PACS and my impression is as follows:  Right elbow x-rays 3 views done in clinic today to evaluate right elbow   pain: There are no acute fractures or dislocations.  There are no bone   lesions or masses.  There are no significant degenerative changes.         Study: US upper extremity  Date: 12/02/24  Report: I have read and agree with the radiologist report. 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 Further evaluation with MRI left elbow with and without intravenous contrast is recommended. MRI appears to be ordered in the PACS system and to be done on 12/23/2024.     No discrete mass or fluid collection involving the right elbow in the visualized portions in the area of patient's palpable abnormality pointed out by the patient. Continued clinical follow-up is recommended and if symptoms persist further evaluation with MRI recommended.      Oncology History    No history exists.       Scribe Attestation      I,:  Racheal Mcguire am acting as a scribe while in the presence of the attending physician.:       I,:  Cristóbal Hernandez DO personally performed the services described in this documentation    as scribed in my presence.:            Dr. Cristóbal Hernandez DO, Orthopedic Surgeon  [facility]   Phone:[phone] Fax:[phone]

## 2025-05-16 NOTE — ASSESSMENT & PLAN NOTE
19 y.o. 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     Wound Care   Continue current care., OK to shower., No soaking or bathing for another 2 weeks.  Pain control: PRN  Continue ice packs/elevation  Can continue compression with ACE wrap or compression stockings  Physical therapy: Not indicated at this time however referral to hand therapy placed if she feels that she is lacking ROM or strength  FWB to LUE  Sling/Immobilizer/Brace: None   Complete DVT ppx None  Definitive diagnosis discussed at length with benign intramuscular neoplasm.     Return if symptoms worsen or fail to improve.  Orders:    Ambulatory Referral to PT/OT Hand Therapy; Future

## (undated) DEVICE — COBAN 4 IN STERILE

## (undated) DEVICE — DRAPE SHEET THREE QUARTER

## (undated) DEVICE — TELFA NON-ADHERENT ABSORBENT DRESSING: Brand: TELFA

## (undated) DEVICE — TUBING SUCTION 5MM X 12 FT

## (undated) DEVICE — MAT FLOOR STEP DRI 24 X 36 IN N-STRL

## (undated) DEVICE — BLADE SHAVER DISSECTOR 3.5MM 13CM COOLCUT

## (undated) DEVICE — PAD GROUNDING ADULT

## (undated) DEVICE — NEEDLE 25GA X 1 IN SAFETY GLIDE

## (undated) DEVICE — NEEDLE 21 G X 1

## (undated) DEVICE — IMPERVIOUS STOCKINETTE: Brand: DEROYAL

## (undated) DEVICE — CHLORAPREP HI-LITE 26ML ORANGE

## (undated) DEVICE — DRAPE C-ARMOUR

## (undated) DEVICE — 3M™ STERI-DRAPE™ U-DRAPE 1015: Brand: STERI-DRAPE™

## (undated) DEVICE — SUT FIBERWIRE #2 1/2 CIRCLE T-5 38IN AR-7200

## (undated) DEVICE — TUBING ARTHROSCOPIC WAVE  MAIN PUMP

## (undated) DEVICE — GLOVE SRG BIOGEL ORTHOPEDIC 7.5

## (undated) DEVICE — GLOVE SRG BIOGEL 8

## (undated) DEVICE — TRANSPOSAL ULTRAFLEX DUO/QUAD ULTRA CART MANIFOLD

## (undated) DEVICE — PROBE ABLATION  APOLLO RF 90 DEG MULTI PORT

## (undated) DEVICE — OCCLUSIVE GAUZE STRIP,3% BISMUTH TRIBROMOPHENATE IN PETROLATUM BLEND: Brand: XEROFORM

## (undated) DEVICE — GLOVE INDICATOR PI UNDERGLOVE SZ 7 BLUE

## (undated) DEVICE — ACE WRAP 6 IN UNSTERILE

## (undated) DEVICE — SUT VICRYL 2-0 CT-2 18 IN J726D

## (undated) DEVICE — SUT ETHILON 3-0 PS-1 18 IN 1663H

## (undated) DEVICE — ACE WRAP 6 IN STERILE

## (undated) DEVICE — REM POLYHESIVE ADULT PATIENT RETURN ELECTRODE: Brand: VALLEYLAB

## (undated) DEVICE — Device

## (undated) DEVICE — BETHLEHEM UNIVERSAL  MIONR EXT: Brand: CARDINAL HEALTH

## (undated) DEVICE — SUT MONOCRYL 3-0 PS-2 27 IN Y427H

## (undated) DEVICE — STERILE BETHLEHEM PLASTIC HAND: Brand: CARDINAL HEALTH

## (undated) DEVICE — SPONGE LAP 18 X 18 IN

## (undated) DEVICE — 10FR FRAZIER SUCTION HANDLE: Brand: CARDINAL HEALTH

## (undated) DEVICE — BETHLEHEM UNIVERSAL  ARTHRO PK: Brand: CARDINAL HEALTH

## (undated) DEVICE — INTENDED FOR TISSUE SEPARATION, AND OTHER PROCEDURES THAT REQUIRE A SHARP SURGICAL BLADE TO PUNCTURE OR CUT.: Brand: BARD-PARKER ® CARBON RIB-BACK BLADES

## (undated) DEVICE — U-DRAPE: Brand: CONVERTORS

## (undated) DEVICE — SUT MONOCRYL 4-0 PS-2 27 IN Y426H

## (undated) DEVICE — INTENDED FOR TISSUE SEPARATION, AND OTHER PROCEDURES THAT REQUIRE A SHARP SURGICAL BLADE TO PUNCTURE OR CUT.: Brand: BARD-PARKER SAFETY BLADES SIZE 11, STERILE

## (undated) DEVICE — GAUZE SPONGES,16 PLY: Brand: CURITY

## (undated) DEVICE — GLOVE SRG BIOGEL 7.5

## (undated) DEVICE — FLEXIBLE ADHESIVE BANDAGE,X-LARGE: Brand: CURITY

## (undated) DEVICE — PADDING CAST 6IN COTTON STRL

## (undated) DEVICE — CUFF TOURNIQUET 30 X 4 IN QUICK CONNECT DISP 1BLA

## (undated) DEVICE — SUT ETHILON 3-0 PS-1 18 IN 1663G

## (undated) DEVICE — KNOT PUSHER/SUTURE CUTTER W/ PORTAL SKID BUNDLE

## (undated) DEVICE — SURGI KIT INSTRUMENT ORGANIZER

## (undated) DEVICE — SCD SEQUENTIAL COMPRESSION COMFORT SLEEVE MEDIUM KNEE LENGTH: Brand: KENDALL SCD

## (undated) DEVICE — CHLORAPREP HI-LITE 10.5ML ORANGE

## (undated) DEVICE — BETHLEHEM TOTAL HIP, KIT: Brand: CARDINAL HEALTH

## (undated) DEVICE — SUT MONOCRYL 4-0 PS-2 18 IN Y496G

## (undated) DEVICE — GLOVE INDICATOR PI UNDERGLOVE SZ 8 BLUE

## (undated) DEVICE — MEDI-VAC YANKAUER SUCTION HANDLE W/STRAIGHT TIP & CONTROL VENT: Brand: CARDINAL HEALTH

## (undated) DEVICE — SUT MONOCRYL 2-0 CT-1 36 IN Y945H

## (undated) DEVICE — NEEDLE HYPO 23G X 1-1/2 IN

## (undated) DEVICE — WEBRIL 6 IN UNSTERILE

## (undated) DEVICE — IMMOBILIZER KNEE UNIVERSAL 19 IN

## (undated) DEVICE — SYRINGE 50ML LL

## (undated) DEVICE — PLUMEPEN PRO 10FT

## (undated) DEVICE — NEEDLE HYPO 2G X 1 IN

## (undated) DEVICE — BLADE SHAVER EXCALIBUR 4MM 13CM COOLCUT

## (undated) DEVICE — CUFF TOURNIQUET 18 X 4 IN QUICK CONNECT DISP 1 BLADDER

## (undated) DEVICE — NEEDLE 18 G X 1 1/2

## (undated) DEVICE — ARTHROCARE WAND MULTIVAC 50 DEGREE

## (undated) DEVICE — GLOVE SRG BIOGEL ECLIPSE 8

## (undated) DEVICE — DRAPE C-ARM X-RAY

## (undated) DEVICE — SYRINGE 20ML LL

## (undated) DEVICE — LIGHT HANDLE COVER SLEEVE DISP BLUE STELLAR

## (undated) DEVICE — SUT FIBERWARE 2-0 MENISCUS REPAIR NEEDLE 38IN AR-7223

## (undated) DEVICE — DRAPE EQUIPMENT RF WAND

## (undated) DEVICE — EXOFIN PRECISION PEN HIGH VISCOSITY TOPICAL SKIN ADHESIVE: Brand: EXOFIN PRECISION PEN, 1G

## (undated) DEVICE — 3M™ STERI-STRIP™ REINFORCED ADHESIVE SKIN CLOSURES, R1547, 1/2 IN X 4 IN (12 MM X 100 MM), 6 STRIPS/ENVELOPE: Brand: 3M™ STERI-STRIP™

## (undated) DEVICE — LIGHT GLOVE GREEN

## (undated) DEVICE — INTENT TO BE USED WITH SUTURE MATERIAL FOR TISSUE CLOSURE: Brand: RICHARD-ALLAN®  NEEDLE 1/2 CIRCLE REVERSE CUTTING

## (undated) DEVICE — PADDING CAST 4 IN  COTTON STRL

## (undated) DEVICE — CUFF TOURNIQUET 34 X 4 IN QUICK CONNECT DISP 1BLA

## (undated) DEVICE — NEEDLE SPINAL18G X 3.5 IN QUINCKE

## (undated) DEVICE — BLADE SHAVER TORPEDO CRV 4MM 13MM COOLCUT

## (undated) DEVICE — 4-PORT MANIFOLD: Brand: NEPTUNE 2

## (undated) DEVICE — SPECIMEN CONTAINER STERILE PEEL PACK

## (undated) DEVICE — SUT VICRYL 0 CT-2 18 IN J727D

## (undated) DEVICE — 3M™ IOBAN™ 2 ANTIMICROBIAL INCISE DRAPE 6648EZ: Brand: IOBAN™ 2

## (undated) DEVICE — DRESSING MEPILEX AG BORDER POST-OP 4 X 6 IN

## (undated) DEVICE — UTILITY MARKER,BLACK WITH LABELS: Brand: DEVON

## (undated) DEVICE — ABDOMINAL PAD: Brand: DERMACEA

## (undated) DEVICE — 3000CC GUARDIAN II: Brand: GUARDIAN

## (undated) DEVICE — GLOVE SRG BIOGEL ECLIPSE 6.5

## (undated) DEVICE — SUT VICRYL 0 CT-1 36 IN J946H

## (undated) DEVICE — SUT VICRYL 2-0 SH 27 IN UNDYED J417H

## (undated) DEVICE — BANDAGE, ESMARK LF STR 6"X9' (20/CS): Brand: CYPRESS